# Patient Record
Sex: MALE | Race: WHITE | NOT HISPANIC OR LATINO | Employment: OTHER
[De-identification: names, ages, dates, MRNs, and addresses within clinical notes are randomized per-mention and may not be internally consistent; named-entity substitution may affect disease eponyms.]

---

## 2017-04-21 ENCOUNTER — SURGERY (OUTPATIENT)
Age: 47
End: 2017-04-21

## 2017-04-21 ENCOUNTER — HOSPITAL ENCOUNTER (INPATIENT)
Facility: HOSPITAL | Age: 47
LOS: 1 days | Discharge: HOME | DRG: 419 | End: 2017-04-23
Attending: EMERGENCY MEDICINE | Admitting: SURGERY

## 2017-04-21 ENCOUNTER — ANESTHESIA EVENT (OUTPATIENT)
Dept: SURGERY | Facility: HOSPITAL | Age: 47
DRG: 419 | End: 2017-04-21

## 2017-04-21 ENCOUNTER — ANESTHESIA (OUTPATIENT)
Dept: SURGERY | Facility: HOSPITAL | Age: 47
DRG: 419 | End: 2017-04-21

## 2017-04-21 DIAGNOSIS — R10.11 ABDOMINAL PAIN, RUQ: ICD-10-CM

## 2017-04-21 DIAGNOSIS — K80.00 CALCULUS OF GALLBLADDER WITH ACUTE CHOLECYSTITIS WITHOUT OBSTRUCTION: Primary | ICD-10-CM

## 2017-04-21 LAB
ALBUMIN SERPL BCP-MCNC: 4 G/DL
ALP SERPL-CCNC: 105 U/L
ALT SERPL W/O P-5'-P-CCNC: 25 U/L
ANION GAP SERPL CALC-SCNC: 11 MMOL/L
AST SERPL-CCNC: 23 U/L
BASOPHILS # BLD AUTO: 0.05 K/UL
BASOPHILS NFR BLD: 0.3 %
BILIRUB SERPL-MCNC: 0.5 MG/DL
BILIRUB UR QL STRIP: ABNORMAL
BUN SERPL-MCNC: 14 MG/DL
CALCIUM SERPL-MCNC: 9.6 MG/DL
CHLORIDE SERPL-SCNC: 103 MMOL/L
CLARITY UR: CLEAR
CO2 SERPL-SCNC: 25 MMOL/L
COLOR UR: YELLOW
CREAT SERPL-MCNC: 1.1 MG/DL
DIFFERENTIAL METHOD: ABNORMAL
EOSINOPHIL # BLD AUTO: 0.3 K/UL
EOSINOPHIL NFR BLD: 1.7 %
ERYTHROCYTE [DISTWIDTH] IN BLOOD BY AUTOMATED COUNT: 13.8 %
EST. GFR  (AFRICAN AMERICAN): >60 ML/MIN/1.73 M^2
EST. GFR  (NON AFRICAN AMERICAN): >60 ML/MIN/1.73 M^2
GLUCOSE SERPL-MCNC: 114 MG/DL
GLUCOSE UR QL STRIP: NEGATIVE
HCT VFR BLD AUTO: 50.4 %
HGB BLD-MCNC: 18.1 G/DL
HGB UR QL STRIP: NEGATIVE
KETONES UR QL STRIP: ABNORMAL
LEUKOCYTE ESTERASE UR QL STRIP: NEGATIVE
LYMPHOCYTES # BLD AUTO: 3.6 K/UL
LYMPHOCYTES NFR BLD: 24 %
MCH RBC QN AUTO: 33.9 PG
MCHC RBC AUTO-ENTMCNC: 35.9 %
MCV RBC AUTO: 94 FL
MONOCYTES # BLD AUTO: 1 K/UL
MONOCYTES NFR BLD: 6.9 %
NEUTROPHILS # BLD AUTO: 10 K/UL
NEUTROPHILS NFR BLD: 67.1 %
NITRITE UR QL STRIP: NEGATIVE
PH UR STRIP: 7 [PH] (ref 5–8)
PLATELET # BLD AUTO: 312 K/UL
PMV BLD AUTO: 9.8 FL
POTASSIUM SERPL-SCNC: 3.7 MMOL/L
PROT SERPL-MCNC: 8 G/DL
PROT UR QL STRIP: ABNORMAL
RBC # BLD AUTO: 5.34 M/UL
SODIUM SERPL-SCNC: 139 MMOL/L
SP GR UR STRIP: 1.02 (ref 1–1.03)
URN SPEC COLLECT METH UR: ABNORMAL
UROBILINOGEN UR STRIP-ACNC: ABNORMAL EU/DL
WBC # BLD AUTO: 14.85 K/UL

## 2017-04-21 PROCEDURE — 85025 COMPLETE CBC W/AUTO DIFF WBC: CPT

## 2017-04-21 PROCEDURE — 25000003 PHARM REV CODE 250: Performed by: SURGERY

## 2017-04-21 PROCEDURE — 37000009 HC ANESTHESIA EA ADD 15 MINS: Performed by: SURGERY

## 2017-04-21 PROCEDURE — 36000708 HC OR TIME LEV III 1ST 15 MIN: Performed by: SURGERY

## 2017-04-21 PROCEDURE — 71000033 HC RECOVERY, INTIAL HOUR: Performed by: SURGERY

## 2017-04-21 PROCEDURE — 96376 TX/PRO/DX INJ SAME DRUG ADON: CPT

## 2017-04-21 PROCEDURE — G0378 HOSPITAL OBSERVATION PER HR: HCPCS

## 2017-04-21 PROCEDURE — 99222 1ST HOSP IP/OBS MODERATE 55: CPT | Mod: 57,,, | Performed by: SURGERY

## 2017-04-21 PROCEDURE — 99285 EMERGENCY DEPT VISIT HI MDM: CPT | Mod: 25

## 2017-04-21 PROCEDURE — 25000003 PHARM REV CODE 250: Performed by: EMERGENCY MEDICINE

## 2017-04-21 PROCEDURE — 63600175 PHARM REV CODE 636 W HCPCS: Performed by: NURSE ANESTHETIST, CERTIFIED REGISTERED

## 2017-04-21 PROCEDURE — 96375 TX/PRO/DX INJ NEW DRUG ADDON: CPT

## 2017-04-21 PROCEDURE — 81003 URINALYSIS AUTO W/O SCOPE: CPT

## 2017-04-21 PROCEDURE — 88304 TISSUE EXAM BY PATHOLOGIST: CPT | Performed by: PATHOLOGY

## 2017-04-21 PROCEDURE — 25000003 PHARM REV CODE 250: Performed by: NURSE ANESTHETIST, CERTIFIED REGISTERED

## 2017-04-21 PROCEDURE — 27201423 OPTIME MED/SURG SUP & DEVICES STERILE SUPPLY: Performed by: SURGERY

## 2017-04-21 PROCEDURE — 25500020 PHARM REV CODE 255: Performed by: SURGERY

## 2017-04-21 PROCEDURE — 71000039 HC RECOVERY, EACH ADD'L HOUR: Performed by: SURGERY

## 2017-04-21 PROCEDURE — 80053 COMPREHEN METABOLIC PANEL: CPT

## 2017-04-21 PROCEDURE — 63600175 PHARM REV CODE 636 W HCPCS: Performed by: EMERGENCY MEDICINE

## 2017-04-21 PROCEDURE — 96365 THER/PROPH/DIAG IV INF INIT: CPT

## 2017-04-21 PROCEDURE — 36000709 HC OR TIME LEV III EA ADD 15 MIN: Performed by: SURGERY

## 2017-04-21 PROCEDURE — 63600175 PHARM REV CODE 636 W HCPCS: Performed by: SURGERY

## 2017-04-21 PROCEDURE — 37000008 HC ANESTHESIA 1ST 15 MINUTES: Performed by: SURGERY

## 2017-04-21 PROCEDURE — 88304 TISSUE EXAM BY PATHOLOGIST: CPT | Mod: 26,,, | Performed by: PATHOLOGY

## 2017-04-21 PROCEDURE — 74300 X-RAY BILE DUCTS/PANCREAS: CPT | Mod: 26,,, | Performed by: SURGERY

## 2017-04-21 PROCEDURE — 47563 LAPARO CHOLECYSTECTOMY/GRAPH: CPT | Mod: ,,, | Performed by: SURGERY

## 2017-04-21 PROCEDURE — 0DNW4ZZ RELEASE PERITONEUM, PERCUTANEOUS ENDOSCOPIC APPROACH: ICD-10-PCS | Performed by: SURGERY

## 2017-04-21 PROCEDURE — 0FT44ZZ RESECTION OF GALLBLADDER, PERCUTANEOUS ENDOSCOPIC APPROACH: ICD-10-PCS | Performed by: SURGERY

## 2017-04-21 RX ORDER — NEOSTIGMINE METHYLSULFATE 1 MG/ML
INJECTION, SOLUTION INTRAVENOUS
Status: DISCONTINUED | OUTPATIENT
Start: 2017-04-21 | End: 2017-04-21

## 2017-04-21 RX ORDER — SODIUM CHLORIDE, SODIUM LACTATE, POTASSIUM CHLORIDE, CALCIUM CHLORIDE 600; 310; 30; 20 MG/100ML; MG/100ML; MG/100ML; MG/100ML
INJECTION, SOLUTION INTRAVENOUS CONTINUOUS
Status: DISCONTINUED | OUTPATIENT
Start: 2017-04-21 | End: 2017-04-22

## 2017-04-21 RX ORDER — KETOROLAC TROMETHAMINE 30 MG/ML
INJECTION, SOLUTION INTRAMUSCULAR; INTRAVENOUS
Status: DISCONTINUED | OUTPATIENT
Start: 2017-04-21 | End: 2017-04-21

## 2017-04-21 RX ORDER — MORPHINE SULFATE 4 MG/ML
4 INJECTION, SOLUTION INTRAMUSCULAR; INTRAVENOUS
Status: COMPLETED | OUTPATIENT
Start: 2017-04-21 | End: 2017-04-21

## 2017-04-21 RX ORDER — DIPHENHYDRAMINE HYDROCHLORIDE 50 MG/ML
25 INJECTION INTRAMUSCULAR; INTRAVENOUS EVERY 4 HOURS PRN
Status: DISCONTINUED | OUTPATIENT
Start: 2017-04-21 | End: 2017-04-23 | Stop reason: HOSPADM

## 2017-04-21 RX ORDER — MORPHINE SULFATE 2 MG/ML
2 INJECTION, SOLUTION INTRAMUSCULAR; INTRAVENOUS
Status: DISCONTINUED | OUTPATIENT
Start: 2017-04-21 | End: 2017-04-21

## 2017-04-21 RX ORDER — SODIUM CHLORIDE, SODIUM LACTATE, POTASSIUM CHLORIDE, CALCIUM CHLORIDE 600; 310; 30; 20 MG/100ML; MG/100ML; MG/100ML; MG/100ML
INJECTION, SOLUTION INTRAVENOUS CONTINUOUS
Status: DISCONTINUED | OUTPATIENT
Start: 2017-04-21 | End: 2017-04-21

## 2017-04-21 RX ORDER — LIDOCAINE HYDROCHLORIDE 10 MG/ML
INJECTION, SOLUTION EPIDURAL; INFILTRATION; INTRACAUDAL; PERINEURAL
Status: DISCONTINUED | OUTPATIENT
Start: 2017-04-21 | End: 2017-04-21 | Stop reason: HOSPADM

## 2017-04-21 RX ORDER — MIDAZOLAM HYDROCHLORIDE 1 MG/ML
INJECTION, SOLUTION INTRAMUSCULAR; INTRAVENOUS
Status: DISCONTINUED | OUTPATIENT
Start: 2017-04-21 | End: 2017-04-21

## 2017-04-21 RX ORDER — MEPERIDINE HYDROCHLORIDE 50 MG/ML
12.5 INJECTION INTRAMUSCULAR; INTRAVENOUS; SUBCUTANEOUS ONCE AS NEEDED
Status: DISCONTINUED | OUTPATIENT
Start: 2017-04-21 | End: 2017-04-21 | Stop reason: HOSPADM

## 2017-04-21 RX ORDER — MOXIFLOXACIN HYDROCHLORIDE 400 MG/250ML
400 INJECTION, SOLUTION INTRAVENOUS
Status: DISCONTINUED | OUTPATIENT
Start: 2017-04-21 | End: 2017-04-21

## 2017-04-21 RX ORDER — GLYCOPYRROLATE 0.2 MG/ML
INJECTION INTRAMUSCULAR; INTRAVENOUS
Status: DISCONTINUED | OUTPATIENT
Start: 2017-04-21 | End: 2017-04-21

## 2017-04-21 RX ORDER — FENTANYL CITRATE 50 UG/ML
INJECTION, SOLUTION INTRAMUSCULAR; INTRAVENOUS
Status: DISCONTINUED | OUTPATIENT
Start: 2017-04-21 | End: 2017-04-21

## 2017-04-21 RX ORDER — ENOXAPARIN SODIUM 100 MG/ML
40 INJECTION SUBCUTANEOUS
Status: DISCONTINUED | OUTPATIENT
Start: 2017-04-22 | End: 2017-04-23 | Stop reason: HOSPADM

## 2017-04-21 RX ORDER — PROPOFOL 10 MG/ML
VIAL (ML) INTRAVENOUS
Status: DISCONTINUED | OUTPATIENT
Start: 2017-04-21 | End: 2017-04-21

## 2017-04-21 RX ORDER — SODIUM CHLORIDE 0.9 % (FLUSH) 0.9 %
3 SYRINGE (ML) INJECTION
Status: DISCONTINUED | OUTPATIENT
Start: 2017-04-21 | End: 2017-04-21 | Stop reason: HOSPADM

## 2017-04-21 RX ORDER — SODIUM CHLORIDE, SODIUM LACTATE, POTASSIUM CHLORIDE, CALCIUM CHLORIDE 600; 310; 30; 20 MG/100ML; MG/100ML; MG/100ML; MG/100ML
INJECTION, SOLUTION INTRAVENOUS CONTINUOUS PRN
Status: DISCONTINUED | OUTPATIENT
Start: 2017-04-21 | End: 2017-04-21

## 2017-04-21 RX ORDER — ONDANSETRON 2 MG/ML
INJECTION INTRAMUSCULAR; INTRAVENOUS
Status: DISCONTINUED | OUTPATIENT
Start: 2017-04-21 | End: 2017-04-21

## 2017-04-21 RX ORDER — BUPIVACAINE HYDROCHLORIDE 2.5 MG/ML
INJECTION, SOLUTION INFILTRATION; PERINEURAL
Status: DISCONTINUED | OUTPATIENT
Start: 2017-04-21 | End: 2017-04-21 | Stop reason: HOSPADM

## 2017-04-21 RX ORDER — KETOROLAC TROMETHAMINE 30 MG/ML
30 INJECTION, SOLUTION INTRAMUSCULAR; INTRAVENOUS
Status: COMPLETED | OUTPATIENT
Start: 2017-04-21 | End: 2017-04-21

## 2017-04-21 RX ORDER — LIDOCAINE HYDROCHLORIDE 10 MG/ML
INJECTION INFILTRATION; PERINEURAL
Status: DISCONTINUED | OUTPATIENT
Start: 2017-04-21 | End: 2017-04-21

## 2017-04-21 RX ORDER — ROCURONIUM BROMIDE 10 MG/ML
INJECTION, SOLUTION INTRAVENOUS
Status: DISCONTINUED | OUTPATIENT
Start: 2017-04-21 | End: 2017-04-21

## 2017-04-21 RX ORDER — MORPHINE SULFATE 2 MG/ML
2 INJECTION, SOLUTION INTRAMUSCULAR; INTRAVENOUS
Status: DISCONTINUED | OUTPATIENT
Start: 2017-04-21 | End: 2017-04-23 | Stop reason: HOSPADM

## 2017-04-21 RX ORDER — FENTANYL CITRATE 50 UG/ML
25 INJECTION, SOLUTION INTRAMUSCULAR; INTRAVENOUS EVERY 5 MIN PRN
Status: DISCONTINUED | OUTPATIENT
Start: 2017-04-21 | End: 2017-04-21 | Stop reason: HOSPADM

## 2017-04-21 RX ORDER — HYDROMORPHONE HYDROCHLORIDE 2 MG/ML
0.2 INJECTION, SOLUTION INTRAMUSCULAR; INTRAVENOUS; SUBCUTANEOUS EVERY 5 MIN PRN
Status: DISCONTINUED | OUTPATIENT
Start: 2017-04-21 | End: 2017-04-21 | Stop reason: HOSPADM

## 2017-04-21 RX ORDER — HYDROCODONE BITARTRATE AND ACETAMINOPHEN 10; 325 MG/1; MG/1
1 TABLET ORAL EVERY 4 HOURS PRN
Status: DISCONTINUED | OUTPATIENT
Start: 2017-04-21 | End: 2017-04-23 | Stop reason: HOSPADM

## 2017-04-21 RX ORDER — ONDANSETRON 2 MG/ML
4 INJECTION INTRAMUSCULAR; INTRAVENOUS EVERY 12 HOURS PRN
Status: DISCONTINUED | OUTPATIENT
Start: 2017-04-21 | End: 2017-04-23 | Stop reason: HOSPADM

## 2017-04-21 RX ORDER — MORPHINE SULFATE 10 MG/ML
INJECTION, SOLUTION INTRAMUSCULAR; INTRAVENOUS
Status: DISCONTINUED | OUTPATIENT
Start: 2017-04-21 | End: 2017-04-21

## 2017-04-21 RX ADMIN — PROPOFOL 200 MG: 10 INJECTION, EMULSION INTRAVENOUS at 03:04

## 2017-04-21 RX ADMIN — FENTANYL CITRATE 150 MCG: 50 INJECTION, SOLUTION INTRAMUSCULAR; INTRAVENOUS at 03:04

## 2017-04-21 RX ADMIN — PROPOFOL 150 MG: 10 INJECTION, EMULSION INTRAVENOUS at 06:04

## 2017-04-21 RX ADMIN — MIDAZOLAM HYDROCHLORIDE 2 MG: 1 INJECTION, SOLUTION INTRAMUSCULAR; INTRAVENOUS at 03:04

## 2017-04-21 RX ADMIN — IOHEXOL 50 ML: 300 INJECTION, SOLUTION INTRAVENOUS at 05:04

## 2017-04-21 RX ADMIN — MORPHINE SULFATE 4 MG: 4 INJECTION, SOLUTION INTRAMUSCULAR; INTRAVENOUS at 09:04

## 2017-04-21 RX ADMIN — NEOSTIGMINE METHYLSULFATE 5 MG: 1 INJECTION INTRAVENOUS at 06:04

## 2017-04-21 RX ADMIN — FENTANYL CITRATE 100 MCG: 50 INJECTION, SOLUTION INTRAMUSCULAR; INTRAVENOUS at 03:04

## 2017-04-21 RX ADMIN — SODIUM CHLORIDE 1000 ML: 0.9 INJECTION, SOLUTION INTRAVENOUS at 08:04

## 2017-04-21 RX ADMIN — ONDANSETRON 4 MG: 2 INJECTION, SOLUTION INTRAMUSCULAR; INTRAVENOUS at 06:04

## 2017-04-21 RX ADMIN — MORPHINE SULFATE 5 MG: 10 INJECTION INTRAMUSCULAR; INTRAVENOUS; SUBCUTANEOUS at 06:04

## 2017-04-21 RX ADMIN — LIDOCAINE HYDROCHLORIDE 5 ML: 10 INJECTION, SOLUTION EPIDURAL; INFILTRATION; INTRACAUDAL; PERINEURAL at 04:04

## 2017-04-21 RX ADMIN — BUPIVACAINE HYDROCHLORIDE 10 ML: 2.5 INJECTION, SOLUTION INFILTRATION; PERINEURAL at 04:04

## 2017-04-21 RX ADMIN — KETOROLAC TROMETHAMINE 30 MG: 30 INJECTION, SOLUTION INTRAMUSCULAR; INTRAVENOUS at 06:04

## 2017-04-21 RX ADMIN — GLYCOPYRROLATE 0.8 MG: 0.2 INJECTION, SOLUTION INTRAMUSCULAR; INTRAVENOUS at 06:04

## 2017-04-21 RX ADMIN — SODIUM CHLORIDE, SODIUM LACTATE, POTASSIUM CHLORIDE, AND CALCIUM CHLORIDE: .6; .31; .03; .02 INJECTION, SOLUTION INTRAVENOUS at 11:04

## 2017-04-21 RX ADMIN — LIDOCAINE HYDROCHLORIDE 80 MG: 10 INJECTION, SOLUTION INFILTRATION; PERINEURAL at 03:04

## 2017-04-21 RX ADMIN — MORPHINE SULFATE 2 MG: 2 INJECTION, SOLUTION INTRAMUSCULAR; INTRAVENOUS at 01:04

## 2017-04-21 RX ADMIN — ROCURONIUM BROMIDE 50 MG: 10 INJECTION, SOLUTION INTRAVENOUS at 03:04

## 2017-04-21 RX ADMIN — MOXIFLOXACIN HYDROCHLORIDE 400 MG: 400 INJECTION, SOLUTION INTRAVENOUS at 11:04

## 2017-04-21 RX ADMIN — SODIUM CHLORIDE, SODIUM LACTATE, POTASSIUM CHLORIDE, AND CALCIUM CHLORIDE: 600; 310; 30; 20 INJECTION, SOLUTION INTRAVENOUS at 03:04

## 2017-04-21 RX ADMIN — KETOROLAC TROMETHAMINE 30 MG: 30 INJECTION, SOLUTION INTRAMUSCULAR at 08:04

## 2017-04-21 NOTE — ANESTHESIA PREPROCEDURE EVALUATION
04/21/2017  Alonzo Holloway is a 46 y.o., male.    Anesthesia Evaluation    I have reviewed the Patient Summary Reports.    I have reviewed the Nursing Notes.   I have reviewed the Medications.     Review of Systems  Anesthesia Hx:  No problems with previous Anesthesia  Denies Family Hx of Anesthesia complications.   Denies Personal Hx of Anesthesia complications.   Social:  Smoker    Cardiovascular:  Cardiovascular Normal     Pulmonary:   snores   Renal/:  Renal/ Normal     Hepatic/GI:  Hepatic/GI Normal    Neurological:  Neurology Normal    Endocrine:  Endocrine Normal        Physical Exam   Airway/Jaw/Neck:  Airway Findings: Mouth Opening: Normal Tongue: Normal  General Airway Assessment: Adult  Mallampati: III  TM Distance: Normal, at least 6 cm  Jaw/Neck Findings:  Neck ROM: Normal ROM      Dental:  Dental Findings: In tact   Chest/Lungs:  Chest/Lungs Findings: Clear to auscultation, Normal Respiratory Rate     Heart/Vascular:  Heart Findings: Rate: Normal  Rhythm: Regular Rhythm             Anesthesia Plan  Type of Anesthesia, risks & benefits discussed:  Anesthesia Type:  general  Patient's Preference:   Intra-op Monitoring Plan:   Intra-op Monitoring Plan Comments:   Post Op Pain Control Plan:   Post Op Pain Control Plan Comments:   Induction:   IV  Beta Blocker:  Patient is not currently on a Beta-Blocker (No further documentation required).       Informed Consent: Patient understands risks and agrees with Anesthesia plan.  Questions answered. Anesthesia consent signed with patient.  ASA Score: 2     Day of Surgery Review of History & Physical:    H&P update referred to the surgeon.         Ready For Surgery From Anesthesia Perspective.

## 2017-04-21 NOTE — SUBJECTIVE & OBJECTIVE
No current facility-administered medications on file prior to encounter.      No current outpatient prescriptions on file prior to encounter.       Review of patient's allergies indicates:  No Known Allergies    History reviewed. No pertinent past medical history.  Past Surgical History:   Procedure Laterality Date    SPLENECTOMY, TOTAL       Family History     None        Social History Main Topics    Smoking status: Current Every Day Smoker    Smokeless tobacco: Not on file    Alcohol use No    Drug use: Not on file    Sexual activity: Not on file     Review of Systems   Constitutional: Negative for chills, fever and unexpected weight change.   HENT: Negative for congestion.    Eyes: Negative for visual disturbance.   Respiratory: Negative for shortness of breath.    Cardiovascular: Negative for chest pain.   Gastrointestinal: Positive for abdominal pain. Negative for abdominal distention, constipation, nausea, rectal pain and vomiting.   Genitourinary: Negative for dysuria.   Musculoskeletal: Negative for arthralgias.   Skin: Negative for rash.   Neurological: Negative for light-headedness.   Hematological: Negative for adenopathy.     Objective:     Vital Signs (Most Recent):  Temp: 98.5 °F (36.9 °C) (04/21/17 0840)  Pulse: 63 (04/21/17 1202)  Resp: 18 (04/21/17 0840)  BP: 136/83 (04/21/17 1202)  SpO2: 100 % (04/21/17 1202) Vital Signs (24h Range):  Temp:  [98.5 °F (36.9 °C)] 98.5 °F (36.9 °C)  Pulse:  [63-69] 63  Resp:  [18] 18  SpO2:  [99 %-100 %] 100 %  BP: (136-158)/(80-89) 136/83     Weight: 99.8 kg (220 lb)  Body mass index is 27.5 kg/(m^2).    Physical Exam   Constitutional: He is oriented to person, place, and time. He appears well-developed and well-nourished.   HENT:   Head: Normocephalic and atraumatic.   Eyes: EOM are normal.   Neck: Normal range of motion. Neck supple.   Cardiovascular: Normal rate and regular rhythm.    Pulmonary/Chest: Effort normal and breath sounds normal.   Abdominal:  Soft. Bowel sounds are normal. He exhibits no distension. There is tenderness (mild epigastric).   Well healed upper midline from splenectomy   Neurological: He is alert and oriented to person, place, and time.   Skin: Skin is warm and dry.   Vitals reviewed.      Significant Labs:  CBC:   Recent Labs  Lab 04/21/17  0851   WBC 14.85*   RBC 5.34   HGB 18.1*   HCT 50.4      MCV 94   MCH 33.9*   MCHC 35.9     CMP:   Recent Labs  Lab 04/21/17  0851   *   CALCIUM 9.6   ALBUMIN 4.0   PROT 8.0      K 3.7   CO2 25      BUN 14   CREATININE 1.1   ALKPHOS 105   ALT 25   AST 23   BILITOT 0.5       Significant Diagnostics:  CT:  No obstructive uropathy.  4 mm nonobstructive calculus at the lower pole of the left kidney.    Normal appendix.    Moderate gallbladder distention and faint pericholecystic inflammatory stranding.  Correlate with signs and symptoms of acute cholecystitis.  Recommend ultrasound correlation.    Bibasilar bronchial wall thickening and patchy groundglass infiltrates.    U/S:    Cholelithiasis.  Moderate gallbladder distention and mild gallbladder wall thickening.  Positive sonographic Haque sign.  Clinical correlate with early signs of acute cholecystitis. No biliary ductal dilatation.

## 2017-04-21 NOTE — ED PROVIDER NOTES
SCRIBE #1 NOTE: I, Myke Jason, am scribing for, and in the presence of, Otto Samson MD. I have scribed the entire note.      History      Chief Complaint   Patient presents with    Abdominal Pain     Patient c/o right lower abdominal pain and nausea        Review of patient's allergies indicates:  No Known Allergies     HPI   HPI    4/21/2017, 8:44 AM   History obtained from the patient and friend      History of Present Illness: Alonzo Holloway is a 46 y.o. male patient who presents to the Emergency Department for right lower abd pain which onset gradually this morning at 3 AM. Sxs are constant and moderate in severity. There are no mitigating or exacerbating factors noted. No associated sxs.  Pt denies any fever, N/V/D, dysuria, hematuria, constipation, HA, SOB, CP, blood in stool, and all other sxs at this time. No further complaints or concerns at this time.       Arrival mode: Personal vehicle     PCP: Primary Doctor No       Past Medical History:  History reviewed. No pertinent past medical history.    Past Surgical History:  Past Surgical History:   Procedure Laterality Date    SPLENECTOMY, TOTAL           Family History:  History reviewed. No pertinent family history.    Social History:  Social History    Social History Main Topics    Social History Main Topics    Smoking status: Unknown if ever smoked    Smokeless tobacco: Unknown if ever used    Alcohol Use: Unknown drinking history    Drug Use: Unknown if ever used    Sexual Activity: Unknown         ROS   Review of Systems   Constitutional: Negative for fever.   HENT: Negative for sore throat.    Respiratory: Negative for shortness of breath.    Cardiovascular: Negative for chest pain.   Gastrointestinal: Positive for abdominal pain. Negative for abdominal distention, blood in stool, constipation, diarrhea, nausea and vomiting.   Genitourinary: Negative for dysuria.   Musculoskeletal: Negative for back pain.   Skin: Negative  "for rash.   Neurological: Negative for dizziness, syncope, weakness, numbness and headaches.   Hematological: Does not bruise/bleed easily.       Physical Exam    Initial Vitals   BP Pulse Resp Temp SpO2   04/21/17 0840 04/21/17 0840 04/21/17 0840 04/21/17 0840 04/21/17 0840   158/80 63 18 98.5 °F (36.9 °C) 100 %      Physical Exam  Nursing Notes and Vital Signs Reviewed.  Constitutional: Patient is in no acute distress. Awake and alert. Well-developed and well-nourished.  Head: Atraumatic. Normocephalic.  Eyes: PERRL. EOM intact. Conjunctivae are not pale. No scleral icterus.  ENT: Mucous membranes are moist. Oropharynx is clear and symmetric.    Neck: Supple. Full ROM. No lymphadenopathy.  Cardiovascular: Regular rate. Regular rhythm. No murmurs, rubs, or gallops. Distal pulses are 2+ and symmetric.  Pulmonary/Chest: No respiratory distress. Clear to auscultation bilaterally. No wheezing, rales, or rhonchi.  Abdominal: Soft and non-distended.  There is RUQ tenderness.  No rebound, guarding, or rigidity. Good bowel sounds.  Genitourinary: No CVA tenderness  Musculoskeletal: Moves all extremities. No obvious deformities. No edema. No calf tenderness.  Skin: Warm and dry.  Neurological:  Alert, awake, and appropriate.  Normal speech.  No acute focal neurological deficits are appreciated.  Psychiatric: Normal affect. Good eye contact. Appropriate in content.    ED Course    Procedures  ED Vital Signs:  Vitals:    04/21/17 0840 04/21/17 1017 04/21/17 1102 04/21/17 1202   BP: (!) 158/80 (!) 144/84 (!) 144/89 136/83   Pulse: 63 69 63 63   Resp: 18      Temp: 98.5 °F (36.9 °C)      TempSrc: Oral      SpO2: 100% 99% 100% 100%   Weight: 99.8 kg (220 lb)      Height: 6' 3" (1.905 m)       04/21/17 1318 04/21/17 1323   BP: 137/85    Pulse: 66    Resp:     Temp:     TempSrc:     SpO2: 99%    Weight:  99.8 kg (220 lb)   Height:  6' 2" (1.88 m)       Abnormal Lab Results:  Labs Reviewed   CBC W/ AUTO DIFFERENTIAL - Abnormal; " Notable for the following:        Result Value    WBC 14.85 (*)     Hemoglobin 18.1 (*)     MCH 33.9 (*)     Gran # 10.0 (*)     All other components within normal limits   COMPREHENSIVE METABOLIC PANEL - Abnormal; Notable for the following:     Glucose 114 (*)     All other components within normal limits   URINALYSIS        All Lab Results:  Results for orders placed or performed during the hospital encounter of 04/21/17   CBC auto differential   Result Value Ref Range    WBC 14.85 (H) 3.90 - 12.70 K/uL    RBC 5.34 4.60 - 6.20 M/uL    Hemoglobin 18.1 (H) 14.0 - 18.0 g/dL    Hematocrit 50.4 40.0 - 54.0 %    MCV 94 82 - 98 fL    MCH 33.9 (H) 27.0 - 31.0 pg    MCHC 35.9 32.0 - 36.0 %    RDW 13.8 11.5 - 14.5 %    Platelets 312 150 - 350 K/uL    MPV 9.8 9.2 - 12.9 fL    Gran # 10.0 (H) 1.8 - 7.7 K/uL    Lymph # 3.6 1.0 - 4.8 K/uL    Mono # 1.0 0.3 - 1.0 K/uL    Eos # 0.3 0.0 - 0.5 K/uL    Baso # 0.05 0.00 - 0.20 K/uL    Gran% 67.1 38.0 - 73.0 %    Lymph% 24.0 18.0 - 48.0 %    Mono% 6.9 4.0 - 15.0 %    Eosinophil% 1.7 0.0 - 8.0 %    Basophil% 0.3 0.0 - 1.9 %    Differential Method Automated    Comprehensive metabolic panel   Result Value Ref Range    Sodium 139 136 - 145 mmol/L    Potassium 3.7 3.5 - 5.1 mmol/L    Chloride 103 95 - 110 mmol/L    CO2 25 23 - 29 mmol/L    Glucose 114 (H) 70 - 110 mg/dL    BUN, Bld 14 6 - 20 mg/dL    Creatinine 1.1 0.5 - 1.4 mg/dL    Calcium 9.6 8.7 - 10.5 mg/dL    Total Protein 8.0 6.0 - 8.4 g/dL    Albumin 4.0 3.5 - 5.2 g/dL    Total Bilirubin 0.5 0.1 - 1.0 mg/dL    Alkaline Phosphatase 105 55 - 135 U/L    AST 23 10 - 40 U/L    ALT 25 10 - 44 U/L    Anion Gap 11 8 - 16 mmol/L    eGFR if African American >60 >60 mL/min/1.73 m^2    eGFR if non African American >60 >60 mL/min/1.73 m^2         Imaging Results:  Imaging Results         US Abdomen Limited (Edited Result - FINAL) Result time:  04/21/17 11:16:17    Addendum 1 of 1 by Graahm Norris MD (04/21/17 11:16:17)    Addendum: The 10 mm  gallstone in the gallbladder neck is nonmobile.      Electronically signed by: ALPHONSE NORRIS MD  Date:     04/21/17  Time:    11:16           Final result by Alphonse Norris MD (04/21/17 10:57:52)    Impression:       Cholelithiasis.  Moderate gallbladder distention and mild gallbladder wall thickening.  Positive sonographic Haque sign.  Clinical correlate with early signs of acute cholecystitis. No biliary ductal dilatation.      Electronically signed by: ALPHONSE NORRIS MD  Date:     04/21/17  Time:    10:57     Narrative:    I. Exam: US ABDOMEN LIMITED    Clinical History: Acute right upper quadrant pain.  Initial encounter.      Findings:     The liver size and echotexture is normal. No focal liver lesion identified. Gallbladder distention and gallbladder wall thickening with a mobile echogenic 10 mm gallstone present and gallbladder sludge.  Positive sonographic Haque sign.  No biliary ductal dilatation. The common duct measures 5 mm. The right kidney measures 12.4cm in long axis and has a normal sonographic appearance.  Incidental 2.1 cm right renal cyst.  Visualized pancreas and inferior vena cava appear normal. No free fluid in the upper abdomen. Hepatopedal flow noted in the portal vein.            CT Renal Stone Study ABD Pelvis WO (Final result) Result time:  04/21/17 09:21:13    Final result by Alphonse Norris MD (04/21/17 09:21:13)    Impression:             No obstructive uropathy.  4 mm nonobstructive calculus at the lower pole of the left kidney.    Normal appendix.    Moderate gallbladder distention and faint pericholecystic inflammatory stranding.  Correlate with signs and symptoms of acute cholecystitis.  Recommend ultrasound correlation.    Bibasilar bronchial wall thickening and patchy groundglass infiltrates.        All CT scans at this facility use dose modulation, iterative reconstruction and/or weight based dosing when appropriate to reduce radiation dose to as low as reasonably achievable.        Electronically signed by: ALPHONSE EDGAR MD  Date:     04/21/17  Time:    09:21     Narrative:    Exam: CT RENAL STONE STUDY ABD PELVIS WO      Technique: Axial CT images performed through the abdomen and pelvis without intravenous contrast. Multiplanar reformats were performed and interpreted.    Comparison: None    Clinical History:Acute lower Abdominal pain. Pain abdominal unsp. (789.00)       Findings:         Mild bronchial wall thickening and patchy ground glass opacities in bilateral lower lobes, left greater than right.  No effusions.    4 mm nonobstructive left lower pole renal calculus.  Bilateral 1.9 cm renal cysts at the interpolar regions.  Remnants slight tissue is demonstrated in the left upper quadrant.  Correlate with splenectomy or trauma.  The liver, adrenal glands, and pancreas have a normal noncontrasted appearance.   Moderate gallbladder distention.  Mild pericholecystic fat stranding.  No free fluid or free air identified.  The bowel is nondistended and within normal limits.  The appendix is normal.  The abdominal aorta is normal in caliber.    The urinary bladder is unremarkable.       No significant osseous abnormality is identified.  Moderate degenerative disc disease at L5-S1.                      The Emergency Provider reviewed the vital signs and test results, which are outlined above.    ED Discussion     1:07 PM: Dr. Samson discussed the pt's case with surgery RN who will have Dr. Hancock consult following case.    1:00 PM: Dr. Samson discussed the pt's case with Dr. Hancock (General Surgery) who will see pt in ED.    1:28 PM: Dr. Hancock agrees with current care and management of pt and accepts admission.   Admitting Service: General Surgery  Admitting Physician: Dr. Hancock  Admit to: OR    1:28 PM: Re-evaluated pt. I have discussed test results, shared treatment plan, and the need for admission with patient and family at bedside. Pt and family express understanding at this time and  agree with all information. All questions answered. Pt and family have no further questions or concerns at this time. Pt is ready for admit.        ED Medication(s):  Medications   moxifloxacin 400 mg/250 mL IVPB 400 mg (0 mg Intravenous Stopped 4/21/17 1314)   lactated ringers infusion (not administered)   morphine injection 2 mg (2 mg Intravenous Given 4/21/17 1338)   promethazine (PHENERGAN) 6.25 mg in dextrose 5 % 50 mL IVPB (not administered)   diphenhydrAMINE injection 25 mg (not administered)   enoxaparin injection 40 mg (not administered)   ondansetron injection 4 mg (not administered)   ketorolac injection 30 mg (30 mg Intravenous Given 4/21/17 0858)   sodium chloride 0.9% bolus 1,000 mL (0 mLs Intravenous Stopped 4/21/17 1027)   morphine injection 4 mg (4 mg Intravenous Given 4/21/17 0936)       New Prescriptions    No medications on file             Medical Decision Making    Medical Decision Making:   Clinical Tests:   Lab Tests: Reviewed and Ordered  Radiological Study: Ordered and Reviewed           Scribe Attestation:   Scribe #1: I performed the above scribed service and the documentation accurately describes the services I performed. I attest to the accuracy of the note.    Attending:   Physician Attestation Statement for Scribe #1: I, Otto Samson MD, personally performed the services described in this documentation, as scribed by Myke Jason, in my presence, and it is both accurate and complete.          Clinical Impression       ICD-10-CM ICD-9-CM   1. Calculus of gallbladder with acute cholecystitis without obstruction K80.00 574.00   2. Abdominal pain, RUQ R10.11 789.01       Disposition:   Disposition: Admitted  Condition: Fair         Otto Samson MD  04/21/17 1337

## 2017-04-21 NOTE — H&P
[facility] -   General Surgery  History & Physical    Patient Name: Alonzo Holloway  MRN: [id number]  Admission Date: 4/21/2017  Attending Physician: Otto Samson MD   Primary Care Provider: Primary Doctor No    Patient information was obtained from patient.     Subjective:     Chief Complaint/Reason for Admission: abdominal pain/acute cholecystitis    History of Present Illness: 45 y/o male referred for acute cholecystitis. He reports epigastric abdominal pain since 3 am after eating. The pain did not improve so he came to ER. Denies any fevers/chills, nausea/emesis, diarrhea/consitipation. CT and U/S showed cholelithiasis with inflammation concerning for acute cholecystitis.    No current facility-administered medications on file prior to encounter.      No current outpatient prescriptions on file prior to encounter.       Review of patient's allergies indicates:  No Known Allergies    History reviewed. No pertinent past medical history.  Past Surgical History:   Procedure Laterality Date    SPLENECTOMY, TOTAL       Family History     None        Social History Main Topics    Smoking status: Current Every Day Smoker    Smokeless tobacco: Not on file    Alcohol use No    Drug use: Not on file    Sexual activity: Not on file     Review of Systems   Constitutional: Negative for chills, fever and unexpected weight change.   HENT: Negative for congestion.    Eyes: Negative for visual disturbance.   Respiratory: Negative for shortness of breath.    Cardiovascular: Negative for chest pain.   Gastrointestinal: Positive for abdominal pain. Negative for abdominal distention, constipation, nausea, rectal pain and vomiting.   Genitourinary: Negative for dysuria.   Musculoskeletal: Negative for arthralgias.   Skin: Negative for rash.   Neurological: Negative for light-headedness.   Hematological: Negative for adenopathy.     Objective:     Vital Signs (Most Recent):  Temp: 98.5 °F (36.9 °C)  (04/21/17 0840)  Pulse: 63 (04/21/17 1202)  Resp: 18 (04/21/17 0840)  BP: 136/83 (04/21/17 1202)  SpO2: 100 % (04/21/17 1202) Vital Signs (24h Range):  Temp:  [98.5 °F (36.9 °C)] 98.5 °F (36.9 °C)  Pulse:  [63-69] 63  Resp:  [18] 18  SpO2:  [99 %-100 %] 100 %  BP: (136-158)/(80-89) 136/83     Weight: 99.8 kg (220 lb)  Body mass index is 27.5 kg/(m^2).    Physical Exam   Constitutional: He is oriented to person, place, and time. He appears well-developed and well-nourished.   HENT:   Head: Normocephalic and atraumatic.   Eyes: EOM are normal.   Neck: Normal range of motion. Neck supple.   Cardiovascular: Normal rate and regular rhythm.    Pulmonary/Chest: Effort normal and breath sounds normal.   Abdominal: Soft. Bowel sounds are normal. He exhibits no distension. There is tenderness (mild epigastric).   Well healed upper midline from splenectomy   Neurological: He is alert and oriented to person, place, and time.   Skin: Skin is warm and dry.   Vitals reviewed.      Significant Labs:  CBC:   Recent Labs  Lab 04/21/17  0851   WBC 14.85*   RBC 5.34   HGB 18.1*   HCT 50.4      MCV 94   MCH 33.9*   MCHC 35.9     CMP:   Recent Labs  Lab 04/21/17  0851   *   CALCIUM 9.6   ALBUMIN 4.0   PROT 8.0      K 3.7   CO2 25      BUN 14   CREATININE 1.1   ALKPHOS 105   ALT 25   AST 23   BILITOT 0.5       Significant Diagnostics:  CT:  No obstructive uropathy.  4 mm nonobstructive calculus at the lower pole of the left kidney.    Normal appendix.    Moderate gallbladder distention and faint pericholecystic inflammatory stranding.  Correlate with signs and symptoms of acute cholecystitis.  Recommend ultrasound correlation.    Bibasilar bronchial wall thickening and patchy groundglass infiltrates.    U/S:    Cholelithiasis.  Moderate gallbladder distention and mild gallbladder wall thickening.  Positive sonographic Haque sign.  Clinical correlate with early signs of acute cholecystitis. No biliary ductal  dilatation.        Assessment/Plan:     Calculus of gallbladder with acute cholecystitis without obstruction  OR for lap deepti  IV abx, ivfs, npo  The risks of laparoscopic cholecystectomy including bleeding, infection, common bile duct injury, bile leak, injury to abdominal organs, failure to alleviate symptoms, pulmonary embolus, deep vein thrombosis, cardiac event, and possibility of conversion to an open operation were explained to the patient.   The nature of the patient's condition, probability of success, risks of refusing treatment, and alternatives and risks of the alternatives were also explained.  The patient verbalized understanding.      VTE Risk Mitigation         Ordered     enoxaparin injection 40 mg  Every 24 hours (non-standard times)     Route:  Subcutaneous        04/21/17 1314     Medium Risk of VTE  Once      04/21/17 1314     Place sequential compression device  Until discontinued      04/21/17 1314     Place YADIRA hose  Until discontinued      04/21/17 1314          Antonio Hancock MD  General Surgery  [facility] -

## 2017-04-21 NOTE — IP AVS SNAPSHOT
[address] [place] [address]           Patient Discharge Instructions   Our goal is to set you up for success. This packet includes information on your condition, medications, and your home care.  It will help you care for yourself to prevent having to return to the hospital.     Please ask your nurse if you have any questions.      There are many details to remember when preparing to leave the hospital. Here is what you will need to do:    1. Take your medicine. If you are prescribed medications, review your Medication List on the following pages. You may have new medications to  at the pharmacy and others that you'll need to stop taking. Review the instructions for how and when to take your medications. Talk with your doctor or nurses if you are unsure of what to do.     2. Go to your follow-up appointments. Specific follow-up information is listed in the following pages. Your may be contacted by a nurse or clinical provider about future appointments. Be sure we have all of the phone numbers to reach you. Please contact your provider's office if you are unable to make an appointment.     3. Watch for warning signs. Your doctor or nurse will give you detailed warning signs to watch for and when to call for assistance. These instructions may also include educational information about your condition. If you experience any of warning signs to your health, call your doctor.               ** Verify the list of medication(s) below is accurate and up to date. Carry this with you in case of emergency. If your medications have changed, please notify your healthcare provider.             Medication List      START taking these medications        Additional Info                      hydrocodone-acetaminophen 10-325mg  mg Tab   Commonly known as:  NORCO   Quantity:  30 tablet   Refills:  0   Dose:  1 tablet    Last time this was given:  1 tablet on 4/23/2017  6:38  "AM   Instructions:  Take 1 tablet by mouth every 4 (four) hours as needed.     Begin Date    AM    Noon    PM    Bedtime            Where to Get Your Medications      You can get these medications from any pharmacy     Bring a paper prescription for each of these medications     hydrocodone-acetaminophen 10-325mg  mg Tab                  Please bring to all follow up appointments:    1. A copy of your discharge instructions.  2. All medicines you are currently taking in their original bottles.  3. Identification and insurance card.    Please arrive 15 minutes ahead of scheduled appointment time.    Please call 24 hours in advance if you must reschedule your appointment and/or time.        Your Scheduled Appointments     May 08, 2017 10:20 AM CDT   Post OP with Antonio Hancock MD   [facility] General Surgery ([facility])    [address]  [place] [address] [phone]              Follow-up Information     Follow up with Antonio Hacnock MD In 2 weeks.    Specialty:  General Surgery    Contact information:    [address] [place] [address] [address] [place] [address] [phone]            Primary Diagnosis     Your primary diagnosis was:  Calculus Of Gallbladder With Acute Cholecystitis      Admission Information     Date & Time Provider Department CSN    4/21/2017  8:42 AM Antonio Hancock MD [facility] [id number]      Care Providers     Provider Role Specialty Primary office phone    Antonio Hancock MD Attending Provider General Surgery [phone]    Antonio Hancock MD Surgeon  General Surgery [phone]      Your Vitals Were     BP Pulse Temp Resp Height Weight    137/74 (BP Location: Right arm, Patient Position: Lying, BP Method: Automatic) 65 98.2 °F (36.8 °C) (Oral) 18 6' 2" (1.88 m) 99.8 kg (220 lb)    SpO2 BMI             93% 28.25 kg/m2         Recent Lab Values     No lab values to display.      Pending Labs     Order Current Status    Specimen to Pathology - Surgery Collected (04/21/17 9739) "      Allergies as of 4/23/2017     No Known Allergies      [facility]     [facility] Nurse Care Line - 24/7 Assistance  Unless otherwise directed by your provider, please contact [facility], our nurse care line that is available for 24/7 assistance.     Registered nurses in the [facility] provide clinical advisement, health education, appointment booking, and other advisory services.  Call for this free service at [phone].        Advance Directives     An advance directive is a document which, in the event you are no longer able to make decisions for yourself, tells your healthcare team what kind of treatment you do or do not want to receive, or who you would like to make those decisions for you.  If you do not currently have an advance directive, [facility] encourages you to create one.  For more information call:  [phone]), [phone] ([phone]),  or log on to [email or web address].        Smoking Cessation     If you would like to quit smoking:   You may be eligible for free services if you are a [place] resident and started smoking cigarettes before September 1, 1988.  Call the Smoking Cessation Trust ([facility]) toll free at [phone] or [phone].   Call 2-050-QUIT-NOW if you do not meet the above criteria.   Contact us via email: [email or web address]   View our website for more information: [email or web address]        Language Assistance Services     ATTENTION: Language assistance services are available, free of charge. Please call [phone].      ATENCIÓN: Si habla español, tiene a reeves disposición servicios gratuitos de asistencia lingüística. Llame al [phone].     CHÚ Ý: N?u b?n nói Ti?ng Vi?t, có các d?ch v? h? tr? ngôn ng? mi?n phí dành cho b?n. G?i s? [phone].        [facility] Sign-Up     Activating your [facility] account is as easy as 1-2-3!     1) Visit [email or web address], select Sign Up Now, enter this  activation code and your date of birth, then select Next.  [email or web address]  Expires: 6/7/2017  7:29 AM      2) Create a username and password to use when you visit [facility] in the future and select a security question in case you lose your password and select Next.    3) Enter your e-mail address and click Sign Up!    Additional Information  If you have questions, please e-mail [email or web address] or call [phone] to talk to our [facility] staff. Remember, [facility] is NOT to be used for urgent needs. For medical emergencies, dial 911.          [facility] complies with applicable Federal civil rights laws and does not discriminate on the basis of race, color, national origin, age, disability, or sex.

## 2017-04-21 NOTE — TRANSFER OF CARE
"Anesthesia Transfer of Care Note    Patient: Alonzo Holloway    Procedure(s) Performed: Procedure(s) (LRB):  CHOLECYSTECTOMY-LAPAROSCOPIC (N/A)  CHOLANGIOGRAM (N/A)  [id number] (N/A)    Patient location: PACU    Anesthesia Type: general    Transport from OR: Transported from OR on room air with adequate spontaneous ventilation    Post pain: adequate analgesia    Post assessment: no apparent anesthetic complications    Post vital signs: stable    Level of consciousness: awake    Nausea/Vomiting: no nausea/vomiting    Complications: none          Last vitals:   Visit Vitals    /85    Pulse 66    Temp 36.9 °C (98.5 °F) (Oral)    Resp 18    Ht 6' 2" (1.88 m)    Wt 99.8 kg (220 lb)    SpO2 99%    BMI 28.25 kg/m2     "

## 2017-04-21 NOTE — ASSESSMENT & PLAN NOTE
OR for lap deepti  IV abx, ivfs, npo  The risks of laparoscopic cholecystectomy including bleeding, infection, common bile duct injury, bile leak, injury to abdominal organs, failure to alleviate symptoms, pulmonary embolus, deep vein thrombosis, cardiac event, and possibility of conversion to an open operation were explained to the patient.   The nature of the patient's condition, probability of success, risks of refusing treatment, and alternatives and risks of the alternatives were also explained.  The patient verbalized understanding.

## 2017-04-22 PROBLEM — R79.89 ELEVATED LFTS: Status: ACTIVE | Noted: 2017-04-22

## 2017-04-22 LAB
ALBUMIN SERPL BCP-MCNC: 3.1 G/DL
ALP SERPL-CCNC: 92 U/L
ALT SERPL W/O P-5'-P-CCNC: 61 U/L
ANION GAP SERPL CALC-SCNC: 9 MMOL/L
AST SERPL-CCNC: 41 U/L
BASOPHILS # BLD AUTO: 0.03 K/UL
BASOPHILS NFR BLD: 0.3 %
BILIRUB SERPL-MCNC: 1.1 MG/DL
BUN SERPL-MCNC: 13 MG/DL
CALCIUM SERPL-MCNC: 8.4 MG/DL
CHLORIDE SERPL-SCNC: 107 MMOL/L
CO2 SERPL-SCNC: 23 MMOL/L
CREAT SERPL-MCNC: 0.9 MG/DL
DIFFERENTIAL METHOD: ABNORMAL
EOSINOPHIL # BLD AUTO: 0.3 K/UL
EOSINOPHIL NFR BLD: 2.6 %
ERYTHROCYTE [DISTWIDTH] IN BLOOD BY AUTOMATED COUNT: 14.4 %
EST. GFR  (AFRICAN AMERICAN): >60 ML/MIN/1.73 M^2
EST. GFR  (NON AFRICAN AMERICAN): >60 ML/MIN/1.73 M^2
GLUCOSE SERPL-MCNC: 135 MG/DL
HCT VFR BLD AUTO: 47.4 %
HGB BLD-MCNC: 15.9 G/DL
LYMPHOCYTES # BLD AUTO: 4.3 K/UL
LYMPHOCYTES NFR BLD: 38.4 %
MCH RBC QN AUTO: 32.4 PG
MCHC RBC AUTO-ENTMCNC: 33.5 %
MCV RBC AUTO: 97 FL
MONOCYTES # BLD AUTO: 0.7 K/UL
MONOCYTES NFR BLD: 6.2 %
NEUTROPHILS # BLD AUTO: 5.8 K/UL
NEUTROPHILS NFR BLD: 52.5 %
PLATELET # BLD AUTO: 315 K/UL
PMV BLD AUTO: 10.4 FL
POTASSIUM SERPL-SCNC: 3.7 MMOL/L
PROT SERPL-MCNC: 6.4 G/DL
RBC # BLD AUTO: 4.91 M/UL
SODIUM SERPL-SCNC: 139 MMOL/L
WBC # BLD AUTO: 11.08 K/UL

## 2017-04-22 PROCEDURE — 25000003 PHARM REV CODE 250: Performed by: SURGERY

## 2017-04-22 PROCEDURE — 36415 COLL VENOUS BLD VENIPUNCTURE: CPT

## 2017-04-22 PROCEDURE — 85025 COMPLETE CBC W/AUTO DIFF WBC: CPT

## 2017-04-22 PROCEDURE — 11000001 HC ACUTE MED/SURG PRIVATE ROOM

## 2017-04-22 PROCEDURE — 80053 COMPREHEN METABOLIC PANEL: CPT

## 2017-04-22 RX ORDER — IBUPROFEN 200 MG
1 TABLET ORAL DAILY
Status: DISCONTINUED | OUTPATIENT
Start: 2017-04-22 | End: 2017-04-23 | Stop reason: HOSPADM

## 2017-04-22 RX ORDER — HYDROCODONE BITARTRATE AND ACETAMINOPHEN 10; 325 MG/1; MG/1
1 TABLET ORAL EVERY 4 HOURS PRN
Qty: 30 TABLET | Refills: 0 | Status: SHIPPED | OUTPATIENT
Start: 2017-04-22

## 2017-04-22 RX ADMIN — HYDROCODONE BITARTRATE AND ACETAMINOPHEN 1 TABLET: 10; 325 TABLET ORAL at 04:04

## 2017-04-22 RX ADMIN — HYDROCODONE BITARTRATE AND ACETAMINOPHEN 1 TABLET: 10; 325 TABLET ORAL at 02:04

## 2017-04-22 RX ADMIN — HYDROCODONE BITARTRATE AND ACETAMINOPHEN 1 TABLET: 10; 325 TABLET ORAL at 07:04

## 2017-04-22 NOTE — SUBJECTIVE & OBJECTIVE
Interval History: tolerating diet, pain controlled, ambulating well    Medications:  Continuous Infusions:   Scheduled Meds:   enoxaparin  40 mg Subcutaneous Q24H    nicotine  1 patch Transdermal Daily     PRN Meds:diphenhydrAMINE, hydrocodone-acetaminophen 10-325mg, morphine, ondansetron, promethazine (PHENERGAN) IVPB     Review of patient's allergies indicates:  No Known Allergies  Objective:     Vital Signs (Most Recent):  Temp: 98.1 °F (36.7 °C) (04/22/17 0445)  Pulse: 71 (04/22/17 0445)  Resp: 18 (04/22/17 0445)  BP: 127/79 (04/22/17 0445)  SpO2: 96 % (04/22/17 0445) Vital Signs (24h Range):  Temp:  [97.7 °F (36.5 °C)-98.5 °F (36.9 °C)] 98.1 °F (36.7 °C)  Pulse:  [63-73] 71  Resp:  [11-23] 18  SpO2:  [96 %-100 %] 96 %  BP: (100-158)/(60-89) 127/79     Weight: 99.8 kg (220 lb)  Body mass index is 28.25 kg/(m^2).    Intake/Output - Last 3 Shifts       04/20 0700 - 04/21 0659 04/21 0700 - 04/22 0659 04/22 0700 - 04/23 0659    P.O.  780     I.V. (mL/kg)  2447.5 (24.5)     Total Intake(mL/kg)  3227.5 (32.3)     Urine (mL/kg/hr)  4     Emesis/NG output  0     Blood  25     Total Output   29      Net   +3198.5                   Physical Exam   Constitutional: He appears well-developed and well-nourished.   Cardiovascular: Normal rate.    Pulmonary/Chest: Effort normal.   Abdominal: Soft. He exhibits no distension. Tenderness: attp.   Incisions c,d,i   Vitals reviewed.      Significant Labs:  CBC:   Recent Labs  Lab 04/22/17 0524   WBC 11.08   RBC 4.91   HGB 15.9   HCT 47.4      MCV 97   MCH 32.4*   MCHC 33.5     CMP:   Recent Labs  Lab 04/22/17  0524   *   CALCIUM 8.4*   ALBUMIN 3.1*   PROT 6.4      K 3.7   CO2 23      BUN 13   CREATININE 0.9   ALKPHOS 92   ALT 61*   AST 41*   BILITOT 1.1*

## 2017-04-22 NOTE — ASSESSMENT & PLAN NOTE
Likely from inflammation/surgery as patient's gallbladder significantly inflamed   Repeat labs in am if increase will get HIDA to eval for leak less likely

## 2017-04-22 NOTE — PLAN OF CARE
Problem: Patient Care Overview  Goal: Plan of Care Review  Outcome: Ongoing (interventions implemented as appropriate)  patient has had no complaints of pain, VSS, tolerated dinner with no complaints of N/V, IVF infusing.  Will continue to monitor

## 2017-04-22 NOTE — OP NOTE
[facility]  Surgery Department  Operative Note    SUMMARY     Date of Procedure: 4/21/2017     Procedure: Procedure(s) (LRB):  CHOLECYSTECTOMY-LAPAROSCOPIC   CHOLANGIOGRAM (N/A)  [id number] (N/A)     Surgeon(s) and Role:     * Antonio Hancock MD - Primary    Assisting Surgeon: None    Pre-Operative Diagnosis: Calculus of gallbladder with acute cholecystitis without obstruction [K80.00]    Post-Operative Diagnosis: Post-Op Diagnosis Codes:     * Calculus of gallbladder with acute cholecystitis without obstruction [K80.00]    Anesthesia: General    Technical Procedures Used: laparoscopic lysis of adhesions and deepti with cholangiogram    Description of the Findings of the Procedure: acute cholecystitis, normal cholangiogram    Complications: No    Estimated Blood Loss (EBL): 25 mL           Implants: * No implants in log *    Specimens:   Specimen (12h ago through future)    Start     Ordered    04/21/17 1617  Specimen to Pathology - Surgery  Once     Comments:  1. Gallbladder (perm)    Dx: acute cholecystitis    04/21/17 1617                  Condition: Good    Disposition: PACU - hemodynamically stable.    Procedure in Detail:  The patient was placed on the operating table in the supine position. SCDs were placed for DVT prophylaxis and antibiotics were administered within an hour prior to the incision. General anesthesia was induced. The abdomen was prepped and draped in a sterile fashion.      An incision was made at the umbilicus. The 5mm optieview port was placed under visualization and insufflation obtained.The abdomen was insufflated with carbon dioxide to a pressure of 15 mmHg. The patient tolerated insufflation well.      The laparoscope was inserted and the abdomen inspected. No injuries from the initial trocar placement were noted. There were significant adhesions throughout the upper part of the abdomen.   Two 5 mm trocars in the right mid clavicular line and along the  right lateral costal margin. The adhesions were lysed sharply using endoshears; this took greater than 30 mins. Once we had adequate exposure a 12 mmm trocar was then inserted in the epigastrium. The abdomen was inspected and no gross abnormalities were found. The table was placed in the reverse Trendelenburg position with the right side up.       The dome of the gallbladder was grasped with an atraumatic grasper passed through the lateral port and retracted over the dome of the liver. The gallbladder was covered with omental adhesions which were lysed. The infundibulum was also grasped with an atraumatic grasper through the midclavicular port and retracted toward the right lower quadrant. This maneuver exposed Calot's triangle. The peritoneum overlying the gallbladder infundibulum was then incised and the cystic duct and cystic artery were identified and circumferentially dissected. The gallbladder was dissected free from the fossa to further evaluate the cystic duct. This appeared very dilated so we elected to perform a cholangiogram. A ductotomy was made at the neck of the gallbladder and catheter inserted. A cholangiogram was performed identifying the CBD and branches. The catheter was then removed. The cystic artery was triply clipped and ligated. Due to the extensive inflammation we elected to ligate the gallbladder at the neck just below the ductotomy with the power stapler with a white load as the area that narrowed cystic ductgoing to the CBD was covered with dense inflammation. There was a small amount of bleeding at the staple line so we stapled again just below this area to ensure hemostasis. Hemostasis was achieved and no bleeding or leakage from the duct was noted. The area was thoroughly irrigated until returned clear.       The gallbladder was already free from the fossa at this point. Hemostasis was checked and the gallbladder was removed through the epigastic port in an endocatch bag. The  gallbladder was passed off the table as a specimen. The gallbladder fossa was copiously irrigated with saline, and hemostasis was obtained. There was no evidence of bleeding of the gallbladder fossa or cystic artery or leakage of bile from the cystic duct stump.       Secondary trocars were removed under direct vision. No bleeding was noted. The epigastric port fascia was closed with a 0-Vicryl suture. The skin for all ports was closed with 4-0 Monocryl subcuticular sutures.

## 2017-04-22 NOTE — ANESTHESIA POSTPROCEDURE EVALUATION
"Anesthesia Post Evaluation    Patient: Alonzo Holloway    Procedure(s) Performed: Procedure(s) (LRB):  CHOLECYSTECTOMY-LAPAROSCOPIC (N/A)  CHOLANGIOGRAM (N/A)  [id number] (N/A)    Final Anesthesia Type: general  Patient location during evaluation: PACU  Patient participation: Yes- Able to Participate  Level of consciousness: awake and alert  Post-procedure vital signs: reviewed and stable  Pain management: adequate  Airway patency: patent  PONV status at discharge: No PONV  Anesthetic complications: no      Cardiovascular status: blood pressure returned to baseline  Respiratory status: unassisted and spontaneous ventilation  Hydration status: euvolemic  Follow-up not needed.        Visit Vitals    /73 (BP Location: Right arm, Patient Position: Lying, BP Method: Automatic)    Pulse 67    Temp 36.5 °C (97.7 °F) (Temporal)    Resp 11    Ht 6' 2" (1.88 m)    Wt 99.8 kg (220 lb)    SpO2 98%    BMI 28.25 kg/m2       Pain/Ramona Score: Pain Assessment Performed: Yes (4/21/2017  8:00 PM)  Presence of Pain: denies (4/21/2017  8:00 PM)  Pain Rating Prior to Med Admin: 6 (4/21/2017  1:38 PM)  Ramona Score: 9 (4/21/2017  8:00 PM)      "

## 2017-04-22 NOTE — NURSING
patient  Admitted to obs from pacu. VVS, No complaints of pain or nausea, responds to voice but is drowsy, patient sister to arrive shortly to stay with patient. Bed alarm activated. See flowsheets for further assessment

## 2017-04-22 NOTE — PLAN OF CARE
Escorted to room 224 via stretcher. Maintains o2 on room air. Denies pain and nausea. Responds to verbal stimuli but remains sleepy due to anesthesia and pain medications. Surgical incisions approx and dry with dermaflex intact x4 sites. Pt able to move self from stretcher to bed with minimal assistance. Report to josh mcginnis rn. Pt's mom went home but stated his sister will come back to stay with pt tonight. Pt transfer completed without incident.

## 2017-04-23 VITALS
RESPIRATION RATE: 19 BRPM | WEIGHT: 220 LBS | DIASTOLIC BLOOD PRESSURE: 79 MMHG | TEMPERATURE: 98 F | HEART RATE: 70 BPM | BODY MASS INDEX: 28.23 KG/M2 | OXYGEN SATURATION: 95 % | SYSTOLIC BLOOD PRESSURE: 138 MMHG | HEIGHT: 74 IN

## 2017-04-23 PROBLEM — R79.89 ELEVATED LFTS: Status: RESOLVED | Noted: 2017-04-22 | Resolved: 2017-04-23

## 2017-04-23 LAB
ALBUMIN SERPL BCP-MCNC: 2.9 G/DL
ALP SERPL-CCNC: 97 U/L
ALT SERPL W/O P-5'-P-CCNC: 50 U/L
ANION GAP SERPL CALC-SCNC: 7 MMOL/L
AST SERPL-CCNC: 30 U/L
BASOPHILS # BLD AUTO: 0.03 K/UL
BASOPHILS NFR BLD: 0.3 %
BILIRUB SERPL-MCNC: 0.4 MG/DL
BUN SERPL-MCNC: 6 MG/DL
CALCIUM SERPL-MCNC: 8.4 MG/DL
CHLORIDE SERPL-SCNC: 109 MMOL/L
CO2 SERPL-SCNC: 25 MMOL/L
CREAT SERPL-MCNC: 0.8 MG/DL
DIFFERENTIAL METHOD: ABNORMAL
EOSINOPHIL # BLD AUTO: 0.5 K/UL
EOSINOPHIL NFR BLD: 4.4 %
ERYTHROCYTE [DISTWIDTH] IN BLOOD BY AUTOMATED COUNT: 14.3 %
EST. GFR  (AFRICAN AMERICAN): >60 ML/MIN/1.73 M^2
EST. GFR  (NON AFRICAN AMERICAN): >60 ML/MIN/1.73 M^2
GLUCOSE SERPL-MCNC: 103 MG/DL
HCT VFR BLD AUTO: 45.4 %
HGB BLD-MCNC: 15.5 G/DL
LYMPHOCYTES # BLD AUTO: 4.6 K/UL
LYMPHOCYTES NFR BLD: 45.2 %
MCH RBC QN AUTO: 33.2 PG
MCHC RBC AUTO-ENTMCNC: 34.1 %
MCV RBC AUTO: 97 FL
MONOCYTES # BLD AUTO: 0.8 K/UL
MONOCYTES NFR BLD: 7.9 %
NEUTROPHILS # BLD AUTO: 4.3 K/UL
NEUTROPHILS NFR BLD: 42.2 %
PLATELET # BLD AUTO: 282 K/UL
PMV BLD AUTO: 10.1 FL
POTASSIUM SERPL-SCNC: 3.8 MMOL/L
PROT SERPL-MCNC: 6.2 G/DL
RBC # BLD AUTO: 4.67 M/UL
SODIUM SERPL-SCNC: 141 MMOL/L
WBC # BLD AUTO: 10.21 K/UL

## 2017-04-23 PROCEDURE — 80053 COMPREHEN METABOLIC PANEL: CPT

## 2017-04-23 PROCEDURE — 85025 COMPLETE CBC W/AUTO DIFF WBC: CPT

## 2017-04-23 PROCEDURE — 25000003 PHARM REV CODE 250: Performed by: SURGERY

## 2017-04-23 PROCEDURE — 36415 COLL VENOUS BLD VENIPUNCTURE: CPT

## 2017-04-23 RX ADMIN — HYDROCODONE BITARTRATE AND ACETAMINOPHEN 1 TABLET: 10; 325 TABLET ORAL at 01:04

## 2017-04-23 RX ADMIN — HYDROCODONE BITARTRATE AND ACETAMINOPHEN 1 TABLET: 10; 325 TABLET ORAL at 06:04

## 2017-04-23 NOTE — PROGRESS NOTES
Went over discharge and post op instructions with patient.   Stressed importance of making and keeping all follow ups.   Patient verbalized understanding and has no questions in regards to discharge.  IV removed, catheter intact.  Patient dressed and waiting for transportation home to arrive.

## 2017-04-23 NOTE — PLAN OF CARE
Problem: Patient Care Overview  Goal: Plan of Care Review  Outcome: Ongoing (interventions implemented as appropriate)  Fall precautions maintained and no falls on shift  Patient ambulated in room  Patient c/o pain abd- Norco 10 mg given and pain moderately relieved  Patient admits to smoking in room this AM. Patient instructed that he cannot smoke in room. Dr. Hancock notified. Patient offered nicotine patch for smoking cravings- patient refused patch.

## 2017-04-23 NOTE — PLAN OF CARE
Problem: Patient Care Overview  Goal: Plan of Care Review  Outcome: Ongoing (interventions implemented as appropriate)  Patient remains free from injury or falls during shift; plan of care reviewed with patient; pt verbalized understanding; patient tolerating regular diet; ambulating in hallway and room; pain well controlled with Norco 10/325 mg; Will continue to monitor with hourly rounding.

## 2017-04-23 NOTE — DISCHARGE SUMMARY
[facility]   General Surgery  Discharge Summary      Patient Name: Alonzo Holloway  MRN: [id number]  Admission Date: 4/21/2017  Hospital Length of Stay: 1 days  Discharge Date and Time:  04/23/2017 8:11 AM  Attending Physician: Antonio Hancock MD   Discharging Provider: Antonio Hancock MD  Primary Care Provider: Primary Doctor No    HPI:   45 y/o male referred for acute cholecystitis. He reports epigastric abdominal pain since 3 am after eating. The pain did not improve so he came to ER. Denies any fevers/chills, nausea/emesis, diarrhea/consitipation. CT and U/S showed cholelithiasis with inflammation concerning for acute cholecystitis.    Procedure(s) (LRB):  CHOLECYSTECTOMY-LAPAROSCOPIC (N/A)  CHOLANGIOGRAM (N/A)  [id number] (N/A)      Indwelling Lines/Drains at time of discharge:   Lines/Drains/Airways          No matching active lines, drains, or airways        Hospital Course: Pod 1 s/p lap deepti tolerating diet, pain controlled, ambulating well    Pod 2 doing well ready for discharge      Consults: none    Significant Diagnostic Studies: none    Pending Diagnostic Studies:     None        Final Active Diagnoses:    Diagnosis Date Noted POA    PRINCIPAL PROBLEM:  Calculus of gallbladder with acute cholecystitis without obstruction [K80.00] 04/21/2017 Yes      Problems Resolved During this Admission:    Diagnosis Date Noted Date Resolved POA    Elevated LFTs [R94.5] 04/22/2017 04/23/2017 No      Discharged Condition: good    Disposition: Home or Self Care    Follow Up:  Follow-up Information     Follow up with Antonio Hancock MD In 2 weeks.    Specialty:  General Surgery    Contact information:    [address] [place] [address] [address] [place] [address] [phone]          Patient Instructions:   No discharge procedures on file.  Medications:  Reconciled Home Medications:   Current Discharge Medication List      START taking these medications    Details   hydrocodone-acetaminophen 10-325mg  (NORCO)  mg Tab Take 1 tablet by mouth every 4 (four) hours as needed.  Qty: 30 tablet, Refills: 0           Time spent on the discharge of patient: 30 minutes    Antonio Hancock MD  General Surgery  [facility]

## 2017-05-26 ENCOUNTER — TELEPHONE (OUTPATIENT)
Dept: SURGERY | Facility: CLINIC | Age: 47
End: 2017-05-26

## 2017-05-26 NOTE — TELEPHONE ENCOUNTER
----- Message from Antonio Hancock MD sent at 5/26/2017  8:43 AM CDT -----  Regarding: FW: patient of Dr.Kyle Hancock  Contact: [phone]  The patient should come in for his post op appt if he is having pain to be evaluated, please schedule him an appt to be seen in my clinic.  Thanks  Antonio  ----- Message -----  From: Kb Logan MD  Sent: 5/26/2017   8:24 AM  To: Antonio Hancock MD  Subject: FW: patient of Dr.Kyle Hancock                     Patient called developed pain under his ribs after his cholecystectomy one month ago  ----- Message -----  From: Maryann Duron  Sent: 5/24/2017   6:08 PM  To: Kb Logan MD  Subject: patient of Dr.Kyle Hancock                         Patient is calling regarding complications from a surgery 4 weeks ago

## 2018-05-31 NOTE — PLAN OF CARE
Escorted to preop via stretcher and accompanied by rn.    Patient sitting up in bed eating without any problems.

## 2024-01-13 NOTE — PROGRESS NOTES
[facility]   General Surgery  Progress Note    Subjective:     History of Present Illness:  45 y/o male referred for acute cholecystitis. He reports epigastric abdominal pain since 3 am after eating. The pain did not improve so he came to ER. Denies any fevers/chills, nausea/emesis, diarrhea/consitipation. CT and U/S showed cholelithiasis with inflammation concerning for acute cholecystitis.    Post-Op Info:  Procedure(s) (LRB):  CHOLECYSTECTOMY-LAPAROSCOPIC (N/A)  CHOLANGIOGRAM (N/A)  [id number] (N/A)   1 Day Post-Op     Interval History: tolerating diet, pain controlled, ambulating well    Medications:  Continuous Infusions:   Scheduled Meds:   enoxaparin  40 mg Subcutaneous Q24H    nicotine  1 patch Transdermal Daily     PRN Meds:diphenhydrAMINE, hydrocodone-acetaminophen 10-325mg, morphine, ondansetron, promethazine (PHENERGAN) IVPB     Review of patient's allergies indicates:  No Known Allergies  Objective:     Vital Signs (Most Recent):  Temp: 98.1 °F (36.7 °C) (04/22/17 0445)  Pulse: 71 (04/22/17 0445)  Resp: 18 (04/22/17 0445)  BP: 127/79 (04/22/17 0445)  SpO2: 96 % (04/22/17 0445) Vital Signs (24h Range):  Temp:  [97.7 °F (36.5 °C)-98.5 °F (36.9 °C)] 98.1 °F (36.7 °C)  Pulse:  [63-73] 71  Resp:  [11-23] 18  SpO2:  [96 %-100 %] 96 %  BP: (100-158)/(60-89) 127/79     Weight: 99.8 kg (220 lb)  Body mass index is 28.25 kg/(m^2).    Intake/Output - Last 3 Shifts       04/20 0700 - 04/21 0659 04/21 0700 - 04/22 0659 04/22 0700 - 04/23 0659    P.O.  780     I.V. (mL/kg)  2447.5 (24.5)     Total Intake(mL/kg)  3227.5 (32.3)     Urine (mL/kg/hr)  4     Emesis/NG output  0     Blood  25     Total Output   29      Net   +3198.5                   Physical Exam   Constitutional: He appears well-developed and well-nourished.   Cardiovascular: Normal rate.    Pulmonary/Chest: Effort normal.   Abdominal: Soft. He exhibits no distension. Tenderness: attp.   Incisions c,d,i   Vitals  reviewed.      Significant Labs:  CBC:   Recent Labs  Lab 04/22/17 0524   WBC 11.08   RBC 4.91   HGB 15.9   HCT 47.4      MCV 97   MCH 32.4*   MCHC 33.5     CMP:   Recent Labs  Lab 04/22/17 0524   *   CALCIUM 8.4*   ALBUMIN 3.1*   PROT 6.4      K 3.7   CO2 23      BUN 13   CREATININE 0.9   ALKPHOS 92   ALT 61*   AST 41*   BILITOT 1.1*           Assessment/Plan:     Calculus of gallbladder with acute cholecystitis without obstruction  S/p lap deepti  Regular diet  Pain control  Ambulate     Elevated LFTs  Likely from inflammation/surgery as patient's gallbladder significantly inflamed   Repeat labs in am if increase will get HIDA to eval for leak less likely      Antonio Hancock MD  General Surgery  [facility]   Mother

## (undated) DEVICE — DECANTER VIAL ASEPTIC TRANSFER

## (undated) DEVICE — SCISSOR TIPS METAENBAUM LAP

## (undated) DEVICE — NDL PNEUMO INSUFFLATI 120MM

## (undated) DEVICE — SEE MEDLINE ITEM 152622

## (undated) DEVICE — ELECTRODE ENDOPATH + II 5X34

## (undated) DEVICE — SEE MEDLINE ITEM 157027

## (undated) DEVICE — ELECTRODE REM PLYHSV RETURN 9

## (undated) DEVICE — DRAPE ABDOMINAL TIBURON 14X11

## (undated) DEVICE — CATH CHOLANGIO L-L 4FR 40CM

## (undated) DEVICE — POSITIONER HEAD DONUT 9IN FOAM

## (undated) DEVICE — SYS IRRIG PRESSURIZED SPIKE

## (undated) DEVICE — COVER OVERHEAD SURG LT BLUE

## (undated) DEVICE — APPLICATOR CHLORAPREP ORN 26ML

## (undated) DEVICE — APPLIER CLIP ENDO LIGAMAX 5MM

## (undated) DEVICE — DRAPE MOBILE C-ARM

## (undated) DEVICE — KIT ANTIFOG

## (undated) DEVICE — CONTAINER SPECIMEN STRL 4OZ

## (undated) DEVICE — CANNULA ENDOPATH XCEL 5X100MM

## (undated) DEVICE — SUT MONOCRYL 4.0 PS2 CP496G

## (undated) DEVICE — SEE MEDLINE ITEM 146372

## (undated) DEVICE — SYR 3CC LUER LOC

## (undated) DEVICE — TUBING HEATED INSUFFLATOR

## (undated) DEVICE — TROCAR ENDOPATH XCEL 12X100MM

## (undated) DEVICE — LINER GLOVE POWDERFREE SZ 7

## (undated) DEVICE — CLIP ENDO LIGATION LARGE CLIPS

## (undated) DEVICE — SOL NS 1000CC

## (undated) DEVICE — SUPPORT ULNA NERVE PROTECTOR

## (undated) DEVICE — DEVICE CLOSURE DISP 14G

## (undated) DEVICE — HANDLE PISTOL GRIP HAND CNTRL

## (undated) DEVICE — TROCAR ENDOPATH XCEL 5X100MM

## (undated) DEVICE — SEE MEDLINE ITEM 157117

## (undated) DEVICE — SEE MEDLINE ITEM 152739

## (undated) DEVICE — BAG TISS RETRV MONARCH 10MM

## (undated) DEVICE — GLOVE SURGICAL LATEX SZ 7

## (undated) DEVICE — SYR 10CC LUER LOCK

## (undated) DEVICE — NDL SAFETY 25G X 1.5 ECLIPSE

## (undated) DEVICE — SOL 9P NACL IRR PIC IL

## (undated) DEVICE — MANIFOLD 4 PORT

## (undated) DEVICE — ENDOAPTH VAS RELOAD WHITE 2.5

## (undated) DEVICE — ECHELON FLEX POWERED VAS STPLR

## (undated) DEVICE — CORD LAP 10 DISP